# Patient Record
Sex: FEMALE | Race: WHITE | Employment: UNEMPLOYED | URBAN - METROPOLITAN AREA
[De-identification: names, ages, dates, MRNs, and addresses within clinical notes are randomized per-mention and may not be internally consistent; named-entity substitution may affect disease eponyms.]

---

## 2020-06-03 ENCOUNTER — HOSPITAL ENCOUNTER (OUTPATIENT)
Dept: CT IMAGING | Age: 40
Discharge: HOME | End: 2020-06-03
Attending: INTERNAL MEDICINE
Payer: COMMERCIAL

## 2020-06-03 DIAGNOSIS — Q61.2 POLYCYSTIC KIDNEY, AUTOSOMAL DOMINANT: ICD-10-CM

## 2020-06-03 LAB — CREAT BLD-MCNC: 0.7 MG/DL (ref 0.6–1.3)

## 2020-06-03 PROCEDURE — 82565 ASSAY OF CREATININE: CPT

## 2020-06-03 PROCEDURE — 74011000258 HC RX REV CODE- 258: Performed by: RADIOLOGY

## 2020-06-03 PROCEDURE — 74011636320 HC RX REV CODE- 636/320: Performed by: RADIOLOGY

## 2020-06-03 PROCEDURE — 74170 CT ABD WO CNTRST FLWD CNTRST: CPT

## 2020-06-03 RX ORDER — SODIUM CHLORIDE 0.9 % (FLUSH) 0.9 %
10 SYRINGE (ML) INJECTION
Status: COMPLETED | OUTPATIENT
Start: 2020-06-03 | End: 2020-06-03

## 2020-06-03 RX ADMIN — Medication 10 ML: at 08:53

## 2020-06-03 RX ADMIN — IOPAMIDOL 100 ML: 612 INJECTION, SOLUTION INTRAVENOUS at 08:53

## 2020-06-03 RX ADMIN — SODIUM CHLORIDE 100 ML: 900 INJECTION, SOLUTION INTRAVENOUS at 08:53

## 2023-08-30 ENCOUNTER — HOSPITAL ENCOUNTER (OUTPATIENT)
Facility: HOSPITAL | Age: 43
Discharge: HOME | End: 2023-09-02
Attending: INTERNAL MEDICINE
Payer: COMMERCIAL

## 2023-08-30 DIAGNOSIS — Q61.2 ADULT POLYCYSTIC KIDNEY DISEASE: ICD-10-CM

## 2023-08-30 DIAGNOSIS — Q61.2 POLYCYSTIC KIDNEY, ADULT TYPE: ICD-10-CM

## 2023-08-30 PROCEDURE — 74183 MRI ABD W/O CNTR FLWD CNTR: CPT

## 2023-08-30 PROCEDURE — A9575 INJ GADOTERATE MEGLUMI 0.1ML: HCPCS | Performed by: RADIOLOGY

## 2023-08-30 PROCEDURE — 6360000004 HC RX CONTRAST MEDICATION: Performed by: RADIOLOGY

## 2023-08-30 RX ORDER — GADOTERATE MEGLUMINE 376.9 MG/ML
INJECTION INTRAVENOUS
Status: DISPENSED
Start: 2023-08-30 | End: 2023-08-30

## 2023-08-30 RX ORDER — GADOTERATE MEGLUMINE 376.9 MG/ML
14 INJECTION INTRAVENOUS
Status: COMPLETED | OUTPATIENT
Start: 2023-08-30 | End: 2023-08-30

## 2023-08-30 RX ADMIN — GADOTERATE MEGLUMINE 14 ML: 376.9 INJECTION INTRAVENOUS at 11:11

## 2023-09-19 ENCOUNTER — TELEPHONE (OUTPATIENT)
Age: 43
End: 2023-09-19

## 2023-09-19 ENCOUNTER — OFFICE VISIT (OUTPATIENT)
Age: 43
End: 2023-09-19
Payer: COMMERCIAL

## 2023-09-19 VITALS
BODY MASS INDEX: 24.92 KG/M2 | RESPIRATION RATE: 18 BRPM | DIASTOLIC BLOOD PRESSURE: 86 MMHG | SYSTOLIC BLOOD PRESSURE: 119 MMHG | OXYGEN SATURATION: 96 % | WEIGHT: 154.4 LBS | HEART RATE: 93 BPM

## 2023-09-19 DIAGNOSIS — R06.09 DYSPNEA ON EXERTION: ICD-10-CM

## 2023-09-19 DIAGNOSIS — I82.220 IVC THROMBOSIS (HCC): ICD-10-CM

## 2023-09-19 DIAGNOSIS — I82.220 IVC THROMBOSIS (HCC): Primary | ICD-10-CM

## 2023-09-19 DIAGNOSIS — Z79.01 CHRONIC ANTICOAGULATION: ICD-10-CM

## 2023-09-19 DIAGNOSIS — Z79.01 CHRONIC ANTICOAGULATION: Primary | ICD-10-CM

## 2023-09-19 PROCEDURE — 99204 OFFICE O/P NEW MOD 45 MIN: CPT | Performed by: INTERNAL MEDICINE

## 2023-09-19 RX ORDER — APIXABAN 5 MG/1
5 TABLET, FILM COATED ORAL 2 TIMES DAILY
COMMUNITY
Start: 2023-09-10 | End: 2023-09-19 | Stop reason: SDUPTHER

## 2023-09-19 RX ORDER — DOXYCYCLINE HYCLATE 50 MG/1
50 CAPSULE ORAL 2 TIMES DAILY
COMMUNITY
Start: 2023-08-12

## 2023-09-19 RX ORDER — LOSARTAN POTASSIUM 25 MG/1
25 TABLET ORAL DAILY
COMMUNITY
Start: 2023-09-08

## 2023-09-19 RX ORDER — APIXABAN 5 MG/1
5 TABLET, FILM COATED ORAL 2 TIMES DAILY
Qty: 60 TABLET | Refills: 2 | Status: SHIPPED | OUTPATIENT
Start: 2023-09-19

## 2023-09-19 NOTE — TELEPHONE ENCOUNTER
9/19/23 - Per Dr. Patrina Cranker, new orders placed for doppler to have an expected date of today, 9/19/23.    9/19/23 at 5:10 pm - Called the patient and verified ID x 2. Informed the patient that per Dr. Patrina Cranker, a new doppler order was placed with an expected date of today, 9/19/23, and to call this office if she has any more difficulty scheduling an appointment. The patient asked if she should call now or tomorrow 9/20/23 and informed her that they could be closed for the day but they may close at 6 pm and [facility] but [facility] is fine. The patient verbalized understanding and denied any questions or concerns.

## 2023-09-19 NOTE — PROGRESS NOTES
Geral[facility] at [address], [address] [place] [address]  W: [phone]  F: [phone]    Reason for Visit:   Su Pollock is a 37 y.o. female with autosomal dominant polycystic kidney disease and hypertension who is seen in consultation at the request of Dr. 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 History of Present Illness:   Su Pollock is a pleasant 37 y.o. 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 Patient follows with nephrology (Dr. Judge Alpers) for polycystic kidney disease. She had MRI abdomen to evaluate for PCKD. MRI demonstrated innumerable hepatic and renal cysts without any evidence of neoplasm. 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 Patient was started on Eliquis with load. She denies any proceeding LE swelling, redness, erythema prior to the MRI. She does report that in the week leading up to the MRI, she did have some abdominal pain. She thought it was a cyst bursting. She then had hematuria prior to starting the Eliquis. Hematuria lasted for 3 days, then resolved and recurred shortly after starting the Eliquis. She does have history of hematuria from her cysts and is on losartan to minimize the bleeding from her renal cysts. She reports that the blood in the urine is dark brown old blood and does not occur with every episode. She does recall that ~1.5 years ago she had shooting pains that resembled sciatica. Since starting the Eliquis, she has not yet had her period. She states that her periods are typically heavy. Typical cycle - heavy bleeding for 2 days requires pad change every 3-4 hours, temporarily stops, then resumes 3-4 days much lighter. No chest pain, shortness of breath, pleuritic chest pain. She had procedure with ear tubes placed and did moderate sedation in July 2023. Was on birth control for 10 years, stopped 2011. No recent birth control. No recent hospitalization or immobility.   3 prior pregnancies

## 2023-09-19 NOTE — PROGRESS NOTES
Anum Murcia is a 37 y.o. female     Chief Complaint   Patient presents with    Follow-up     IVC thrombus         1. Have you been to the ER, urgent care clinic since your last visit? Hospitalized since your last visit? No    2. Have you seen or consulted any other health care providers outside of the [address] [place] [address] Avenue since your last visit? Include any pap smears or colon screening.   Yes , ENT Sanjiv

## 2023-09-21 ENCOUNTER — CLINICAL DOCUMENTATION (OUTPATIENT)
Age: 43
End: 2023-09-21

## 2023-09-21 NOTE — PROGRESS NOTES
09/21/2023    Called pt to discuss assistance with Eliquis. JAYA.    09/22/2023    Activated Eliquis co-pay card and called pharmacy and put on file. Co-pay card should make Eliquis co-pay $10. Pharmacy put card on file but cannot run the claim because pt picked up script 09/11/2023. Co-pay card will be applied on next fill. LVM informing pt. No further financial assistance is needed at this time.

## 2023-09-25 ENCOUNTER — HOSPITAL ENCOUNTER (OUTPATIENT)
Facility: HOSPITAL | Age: 43
Discharge: HOME | End: 2023-09-28
Attending: INTERNAL MEDICINE
Payer: COMMERCIAL

## 2023-09-25 DIAGNOSIS — I82.220 IVC THROMBOSIS (HCC): ICD-10-CM

## 2023-09-25 DIAGNOSIS — Z79.01 CHRONIC ANTICOAGULATION: ICD-10-CM

## 2023-09-25 PROCEDURE — 93970 EXTREMITY STUDY: CPT

## 2023-11-09 ENCOUNTER — TELEPHONE (OUTPATIENT)
Age: 43
End: 2023-11-09

## 2023-11-09 RX ORDER — APIXABAN 5 MG/1
5 TABLET, FILM COATED ORAL 2 TIMES DAILY
Qty: 60 TABLET | Refills: 2 | Status: SHIPPED | OUTPATIENT
Start: 2023-11-09

## 2023-12-04 DIAGNOSIS — Z79.01 CHRONIC ANTICOAGULATION: ICD-10-CM

## 2023-12-04 DIAGNOSIS — I82.220 IVC THROMBOSIS (HCC): ICD-10-CM

## 2023-12-08 ENCOUNTER — TELEPHONE (OUTPATIENT)
Age: 43
End: 2023-12-08

## 2023-12-08 DIAGNOSIS — I82.220 IVC THROMBOSIS (HCC): Primary | ICD-10-CM

## 2023-12-08 DIAGNOSIS — Z79.01 CHRONIC ANTICOAGULATION: ICD-10-CM

## 2023-12-08 NOTE — TELEPHONE ENCOUNTER
Verified patient ID x 2    Patient called to discuss timing and location of labs, as well as eliquis instructions in regards to labs. Per 's most recent office note: \"Thrombophilia testing after 3 months of therapeutic AC: FVL, prothrombin gene mutation, protein C/S, ATIII, APLA testing. Will temporarily hold [facility] for 2 days prior to testing and on morning of testing and resume after blood draw\"    Advised patient per Dr. Tonya Echols note that she should hold eliquis for two days, get labs done, and then resume eliquis immediately after blood draw. Patient verbalized understanding. Patient was nervous about getting blood drawn as she is on an anticoagulant. RN advised to hold manual pressure for 5-10 minutes after blood draw, or until clotting has been reached. Advised to call our office with any concern for continued bleeding. Patient will get labs done on Wednesday. Advised that thrombophilia testing takes about 2 weeks to result, and patient was amenable to push appointment to early January to ensure labs have resulted before appointment. Patient would like labs done at [facility] as she just moved. Our office will fax labs to short pump draw site.

## 2023-12-08 NOTE — TELEPHONE ENCOUNTER
Pt called and stated she had questions. Dr. Dickson Doe mentioned getting blood work to see if pt's medication was making pt anemic. Pt wants to know if she can just do the labs to check for anemia even though Dr. Dickson Doe said to stop taking blood thinners 2 days prior to labs. Can pt go get just her anemia tests done w/o having stopped Eliquis?        # [phone]

## 2023-12-11 ENCOUNTER — TELEPHONE (OUTPATIENT)
Age: 43
End: 2023-12-11

## 2023-12-11 NOTE — TELEPHONE ENCOUNTER
Left vm regarding appt r/s tentatively from 12/1923 to 1/4/2024 at 9:15am. Requested call back if appt didn't work.

## 2023-12-15 DIAGNOSIS — Z79.01 CHRONIC ANTICOAGULATION: ICD-10-CM

## 2023-12-15 DIAGNOSIS — I82.220 IVC THROMBOSIS (HCC): ICD-10-CM

## 2024-01-09 ENCOUNTER — OFFICE VISIT (OUTPATIENT)
Age: 44
End: 2024-01-09
Payer: COMMERCIAL

## 2024-01-09 VITALS
DIASTOLIC BLOOD PRESSURE: 86 MMHG | HEART RATE: 93 BPM | SYSTOLIC BLOOD PRESSURE: 122 MMHG | OXYGEN SATURATION: 99 % | BODY MASS INDEX: 24.65 KG/M2 | WEIGHT: 152.7 LBS | RESPIRATION RATE: 18 BRPM

## 2024-01-09 DIAGNOSIS — D68.51 FACTOR V LEIDEN CARRIER (HCC): ICD-10-CM

## 2024-01-09 DIAGNOSIS — Z79.01 CHRONIC ANTICOAGULATION: ICD-10-CM

## 2024-01-09 DIAGNOSIS — Q61.3 POLYCYSTIC KIDNEY DISEASE: ICD-10-CM

## 2024-01-09 DIAGNOSIS — I82.220 IVC THROMBOSIS (HCC): Primary | ICD-10-CM

## 2024-01-09 PROCEDURE — 99214 OFFICE O/P EST MOD 30 MIN: CPT | Performed by: INTERNAL MEDICINE

## 2024-01-09 NOTE — PROGRESS NOTES
Chantel Cortez is a 43 y.o. female     Chief Complaint   Patient presents with    Follow-up     IVC thrombus       1. Have you been to the ER, urgent care clinic since your last visit?  Hospitalized since your last visit?No    2. Have you seen or consulted any other health care providers outside of the [facility] since your last visit?  Include any pap smears or colon screening. No      
mild hematuria.  Doppler US 9/25/23 (~1 month after AC) negative for DVT.    Given young age, pursued thrombophilia testing (while holding AC for 3 days), which was notable for FVL heterozygote. Otherwise negative/normal for Protein C/S, AT III, Lupus AC. Also had positive D-dimer.    Recommend indefinite AC given non-transient risk factor of PCKD, positive D-dimer while on AC, and FVL heterozygote. Will follow every 1-2 years to ensure tolerating AC.     #) Factor V Leiden Heterozygote:  Heterozygous FVL is a mild thrombophilia that increases risk of first blood clot 4.9x and of recurrent clot 1.4x. Reviewed that this alone does not mandate indefinite AC and duration of AC is dictated by nature of clot.     #) Autosomal dominant polycystic kidney disease: management per Dr. Mcclain.  Patient will monitor for worsening of hematuria with     #) Dyspnea on exertion: screening for iron deficiency anemia negative.    Plan:      Continue Eliquis 5 mg BID, recommend indefinite AC given non-transient risk factor of polycystic kidney disease  Reviewed strategies to reduce risk of clot with travel. Advised hydration, calf exercises, walking aisles, and compliance with Eliquis to minimize risk of blood clot.  Discussed genetic testing of FVL for first degree relatives   Patient will reach out to discuss AC recommendations prior to any potential surgeries. May consider hernia repair, in which case would hold Eliquis for 3 days prior to and the day of surgery; resume at the discretion of surgeon once adequate hemostasis achieved.   Return to clinic in 1 year. Will need labs annually, which can be done with PCP or Nephrology    Signed By: Tracy Andre MD

## 2024-04-15 DIAGNOSIS — Z79.01 CHRONIC ANTICOAGULATION: ICD-10-CM

## 2024-04-15 DIAGNOSIS — I82.220 IVC THROMBOSIS (HCC): Primary | ICD-10-CM

## 2024-04-15 RX ORDER — APIXABAN 5 MG/1
5 TABLET, FILM COATED ORAL 2 TIMES DAILY
Qty: 60 TABLET | Refills: 2 | Status: SHIPPED | OUTPATIENT
Start: 2024-04-15

## 2024-04-15 NOTE — TELEPHONE ENCOUNTER
Oncology Pharmacist note    Chantel Cortez is a  43 y.o.female  diagnosed with IVC thrombosis . Ms. Cortez is being treated with apixaban.     Medication name: apixaban    Dose:  5 mg   Frequency: twice a day    Ordering provider: Tracy Andre MD      Indefinite anticoagulation        Last OV 1/9/24  Next OV 1/9/25    Refill for apixaban sent to pharmacy on file       Susana Sabillon, PHARMD, BCOP, BCPS    For Pharmacy Admin Tracking Only    Program: Medical Group  CPA in place:  Yes  Recommendation Provided To: Patient/Caregiver: 1 via Telephone  Intervention Detail: Refill(s) Provided  Intervention Accepted By: Patient/Caregiver: 1    Time Spent (min): 10

## 2024-05-09 ENCOUNTER — TELEPHONE (OUTPATIENT)
Age: 44
End: 2024-05-09

## 2024-05-09 NOTE — TELEPHONE ENCOUNTER
Verified patient ID x 2    Patient reports significant hair loss recently. Her dermatologist recommended she take the supplement \"nutrafol.\"     She realized today that the box states \"product may not be compatible with blood thinners.\"    Ingredients are:    Organic Melissa Powder (Root),   Saw Palmetto Extract (Fruit) (45% Fatty Acids),   Sensoril® Ashwagandha Extract (Root and Leaf),   Hydrolyzed Marine Collagen,   Turmeric Extract (Rhizome) (45% Curcuminoids),   Palm Extract (Fruit) (15% Tocotrienol),   Haematococcus Pluvialis (Whole Microalgae) (5% Astaxanthin);     NUTRAFOL® BLEND: L-Lysine, L-Methionine, L-Cysteine, Horsetail Extract (Aerial Parts of Whole Herb), Japanese Knotweed Extract (Root) (50% Resveratrol), Black Pepper Extract (Fruit) (95% Piperine), Cayenne Extract (Fruit) (2% Capsaicinoids); Vitamin A (as Beta-Carotene), Vitamin C (as Ascorbic Acid), Vitamin D (D3 as Cholecalciferol), Vitamin E (as d-alpha Tocopherol), Biotin, Iodine (from Organic Kelp), Zinc (as Zinc Amino Acid Chelate), Selenium (as L-Selenomethionine); Other Ingredients: Vegetable Capsule (Hypromellose), Organic Rice Hulls.

## 2024-05-10 NOTE — TELEPHONE ENCOUNTER
9:51am: pt verified x2, informed pt that pharmacist advises to not use nutrafol due to    the following ingredients can all potentially increase the bleeding risk: black pepper, capsicum,  resveratrol, saw palmetto, selenium, turmeric and the vitamin E  - listed a  moderate risk and to use the combination cautiously. Palm Oil can decrease the effectiveness of antiplatelet agents - listed as moderate risk and to use combination cautiously.       Pt was understanding and had no further questions.

## 2024-07-15 DIAGNOSIS — Z79.01 CHRONIC ANTICOAGULATION: ICD-10-CM

## 2024-07-15 DIAGNOSIS — I82.220 IVC THROMBOSIS (HCC): ICD-10-CM

## 2024-07-15 RX ORDER — APIXABAN 5 MG/1
5 TABLET, FILM COATED ORAL 2 TIMES DAILY
Qty: 60 TABLET | Refills: 2 | Status: SHIPPED | OUTPATIENT
Start: 2024-07-15

## 2024-10-24 DIAGNOSIS — I82.220 IVC THROMBOSIS (HCC): ICD-10-CM

## 2024-10-24 DIAGNOSIS — Z79.01 CHRONIC ANTICOAGULATION: ICD-10-CM

## 2024-10-25 RX ORDER — APIXABAN 5 MG/1
5 TABLET, FILM COATED ORAL 2 TIMES DAILY
Qty: 60 TABLET | Refills: 2 | Status: SHIPPED | OUTPATIENT
Start: 2024-10-25

## 2025-01-06 NOTE — PROGRESS NOTES
[facility] [place]  [address], [address] [facility] [address]  W: [phone]  F: [phone]    Reason for Visit:   Chantel Cortez is a 44 y.o. female with autosomal dominant polycystic kidney disease and hypertension who is seen for follow up of IVC thrombus.    History of Present Illness:   Chantel Cortez is a pleasant 44 y.o. 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    Patient follows with nephrology (Dr. Mcclain) for polycystic kidney disease.  She had MRI abdomen to evaluate for PCKD.  MRI demonstrated innumerable hepatic and renal cysts without any evidence of neoplasm.  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  Patient was started on Eliquis with load.      She denies any proceeding LE swelling, redness, erythema prior to the MRI.  She does report that in the week leading up to the MRI, she did have some abdominal pain.  She thought it was a cyst bursting.  She then had hematuria prior to starting the Eliquis.  Hematuria lasted for 3 days, then resolved and recurred shortly after starting the Eliquis.  She does have history of hematuria from her cysts and is on losartan to minimize the bleeding from her renal cysts.  She reports that the blood in the urine is dark brown old blood and does not occur with every episode.      She does recall that ~1.5 years ago she had shooting pains that resembled sciatica.     Since starting the Eliquis, she has not yet had her period.  She states that her periods are typically heavy. Typical cycle - heavy bleeding for 2 days requires pad change every 3-4 hours, temporarily stops, then resumes 3-4 days much lighter.      She had procedure with ear tubes placed and did moderate sedation in 2023.    Was on birth control for 10 years, stopped .  No recent birth control.  No recent hospitalization or immobility.  3 prior pregnancies with .      Interval History:  Remains on Eliquis, tolerating well. No major bleeding issues.

## 2025-01-09 ENCOUNTER — OFFICE VISIT (OUTPATIENT)
Age: 45
End: 2025-01-09
Payer: COMMERCIAL

## 2025-01-09 VITALS
OXYGEN SATURATION: 99 % | HEIGHT: 66 IN | WEIGHT: 158.8 LBS | RESPIRATION RATE: 18 BRPM | DIASTOLIC BLOOD PRESSURE: 88 MMHG | HEART RATE: 74 BPM | BODY MASS INDEX: 25.52 KG/M2 | TEMPERATURE: 98.4 F | SYSTOLIC BLOOD PRESSURE: 123 MMHG

## 2025-01-09 DIAGNOSIS — Z79.01 CHRONIC ANTICOAGULATION: ICD-10-CM

## 2025-01-09 DIAGNOSIS — I82.220 IVC THROMBOSIS (HCC): Primary | ICD-10-CM

## 2025-01-09 DIAGNOSIS — D68.51 FACTOR V LEIDEN CARRIER (HCC): ICD-10-CM

## 2025-01-09 PROCEDURE — 99213 OFFICE O/P EST LOW 20 MIN: CPT

## 2025-01-09 NOTE — PROGRESS NOTES
Chantel Cortez is a 44 y.o. female    Chief Complaint   Patient presents with    Follow-up     follow up of IVC thrombus.            1. Have you been to the ER, urgent care clinic since your last visit?  Hospitalized since your last visit?No    2. Have you seen or consulted any other health care providers outside of the [facility] since your last visit?  Include any pap smears or colon screening. Nephrology, Dr. Mcclain,  [facility], [facility],  [facility] saw NP.

## 2025-01-24 ENCOUNTER — TELEPHONE (OUTPATIENT)
Age: 45
End: 2025-01-24

## 2025-01-24 NOTE — TELEPHONE ENCOUNTER
Called spoke with Pt. Verified ID x2. Relayed message from NP Kimmy that she discussed with Dr. Andre about Pts Eliquis and it was recommended that she stay on the Eliquis 5 mg twice a day.   Advised Pt to let us know if menstrual periods or hematuria worsen or if she develops any of the symptoms discussed during her office visit (fatigue, shortness of breath, craving ice, chest pain, lightheadedness, extremity swelling/pain/redness).   Pt verbalized understanding stated she has been keeping up with taking her Eliquis, and will call if she develops any of the above sxs.

## 2025-01-28 DIAGNOSIS — I82.220 IVC THROMBOSIS (HCC): ICD-10-CM

## 2025-01-28 DIAGNOSIS — Z79.01 CHRONIC ANTICOAGULATION: ICD-10-CM

## 2025-01-28 RX ORDER — APIXABAN 5 MG/1
5 TABLET, FILM COATED ORAL 2 TIMES DAILY
Qty: 60 TABLET | Refills: 2 | Status: SHIPPED | OUTPATIENT
Start: 2025-01-28

## 2025-04-26 DIAGNOSIS — Z79.01 CHRONIC ANTICOAGULATION: ICD-10-CM

## 2025-04-26 DIAGNOSIS — I82.220 IVC THROMBOSIS (HCC): ICD-10-CM

## 2025-04-28 RX ORDER — APIXABAN 5 MG/1
5 TABLET, FILM COATED ORAL 2 TIMES DAILY
Qty: 60 TABLET | Refills: 2 | Status: SHIPPED | OUTPATIENT
Start: 2025-04-28

## 2025-07-25 DIAGNOSIS — Z79.01 CHRONIC ANTICOAGULATION: ICD-10-CM

## 2025-07-25 DIAGNOSIS — I82.220 IVC THROMBOSIS (HCC): ICD-10-CM

## 2025-07-25 RX ORDER — APIXABAN 5 MG/1
5 TABLET, FILM COATED ORAL 2 TIMES DAILY
Qty: 60 TABLET | Refills: 2 | Status: SHIPPED | OUTPATIENT
Start: 2025-07-25